# Patient Record
Sex: MALE | URBAN - METROPOLITAN AREA
[De-identification: names, ages, dates, MRNs, and addresses within clinical notes are randomized per-mention and may not be internally consistent; named-entity substitution may affect disease eponyms.]

---

## 2023-07-03 ENCOUNTER — TELEPHONE (OUTPATIENT)
Dept: FAMILY MEDICINE | Facility: CLINIC | Age: 48
End: 2023-07-03

## 2023-07-03 NOTE — TELEPHONE ENCOUNTER
Call placed to patient's wife due to message left. Currently not available message left.    ----- Message from Lizy Barrett sent at 7/3/2023 11:01 AM CDT -----  Contact: MARLON, WIFE  Type:  Patient Returning Call    Who Called: MARLON , WIFE   Who Left Message for Patient: TAJ   Does the patient know what this is regarding?: RX    Would the patient rather a call back or a response via [facility]? CALL   Best Call Back Number:[phone] (home)     Additional Information: THANK YOU